# Patient Record
(demographics unavailable — no encounter records)

---

## 2025-01-15 NOTE — PLAN
[FreeTextEntry1] : #HCM - MOLST/HCP forms provided, discussion regarding advanced directives >15 min

## 2025-01-15 NOTE — HISTORY OF PRESENT ILLNESS
[Spouse] : spouse [FreeTextEntry8] : 74-year-old female with type 2 diabetes (most recent A1c was 7.3%), hypertension, hyperlipidemia, hypothyroidism, osteopenia, history of B-cell lymphoma in 2006 currently in remission is presenting today for an acute visit as well as establishment of care visit. She follows with endocrinology who recently checked her A1c and was 7.3%.  She is compliant with Janumet and she has been diabetic for the past 8 years.  She has a cardiologist and workup has been reviewed which includes a normal stress echo.  She would like to get her thyroid hormone checked today as her TSH was elevated several months ago and her dose of levothyroxine was adjusted most recently to 88 mcg daily. Furthermore, she had a DEXA scan as well as a mammogram in October 2024 which were both reportedly normal.  She is not interested in a colonoscopy today and would like to do a Cologuard.  She is also not interested in doing vaccinations today.

## 2025-01-15 NOTE — HEALTH RISK ASSESSMENT
[No] : In the past 12 months have you used drugs other than those required for medical reasons? No [No falls in past year] : Patient reported no falls in the past year [Little interest or pleasure doing things] : 1) Little interest or pleasure doing things [Feeling down, depressed, or hopeless] : 2) Feeling down, depressed, or hopeless [0] : 2) Feeling down, depressed, or hopeless: Not at all (0) [PHQ-2 Negative - No further assessment needed] : PHQ-2 Negative - No further assessment needed [Never] : Never [de-identified] : no [de-identified] : endocrinologist  [de-identified] : fair  [de-identified] : fair  [[id number]] : 0

## 2025-06-13 NOTE — HISTORY OF PRESENT ILLNESS
[de-identified] : 75-year-old female with type 2 diabetes (most recent A1c was 7.3%), hypertension, hyperlipidemia, hypothyroidism, osteopenia, history of B-cell lymphoma in 2006 currently in remission is presenting today for an annual wellness visit. She states that her blood pressure is normal at home and in fact was on the low side and thinks it is high today because she is upset that she had to wait a long time in the waiting area.  She is compliant with all of her medications.  Recent DEXA scan from December 2024 was reviewed and showed osteopenia, she has been on alendronate 70 mg weekly for the past 5 years and wants to know if she can come off of it.  In addition, she had a mammogram in December 2024 which was BI-RADS 3 and it was recommended that she get a 6-month repeat mammogram and ultrasound.  She does admit to eating more carbohydrates than usual but is compliant with all of her medications as prescribed.  Her endocrinologist recently decreased the dosage of levothyroxine to 50 mcg daily.

## 2025-06-13 NOTE — HISTORY OF PRESENT ILLNESS
[de-identified] : 75-year-old female with type 2 diabetes (most recent A1c was 7.3%), hypertension, hyperlipidemia, hypothyroidism, osteopenia, history of B-cell lymphoma in 2006 currently in remission is presenting today for an annual wellness visit. She states that her blood pressure is normal at home and in fact was on the low side and thinks it is high today because she is upset that she had to wait a long time in the waiting area.  She is compliant with all of her medications.  Recent DEXA scan from December 2024 was reviewed and showed osteopenia, she has been on alendronate 70 mg weekly for the past 5 years and wants to know if she can come off of it.  In addition, she had a mammogram in December 2024 which was BI-RADS 3 and it was recommended that she get a 6-month repeat mammogram and ultrasound.  She does admit to eating more carbohydrates than usual but is compliant with all of her medications as prescribed.  Her endocrinologist recently decreased the dosage of levothyroxine to 50 mcg daily.

## 2025-06-13 NOTE — HEALTH RISK ASSESSMENT
[Good] : ~his/her~  mood as  good [No] : In the past 12 months have you used drugs other than those required for medical reasons? No [No falls in past year] : Patient reported no falls in the past year [Little interest or pleasure doing things] : 1) Little interest or pleasure doing things [Feeling down, depressed, or hopeless] : 2) Feeling down, depressed, or hopeless [0] : 2) Feeling down, depressed, or hopeless: Not at all (0) [PHQ-2 Negative - No further assessment needed] : PHQ-2 Negative - No further assessment needed [Patient reported mammogram was abnormal] : Patient reported mammogram was abnormal [Patient reported bone density results were normal] : Patient reported bone density results were normal [HIV test declined] : HIV test declined [Hepatitis C test declined] : Hepatitis C test declined [With Significant Other] : lives with significant other [Unemployed] : unemployed [] :  [Retired] : retired [Fully functional (bathing, dressing, toileting, transferring, walking, feeding)] : Fully functional (bathing, dressing, toileting, transferring, walking, feeding) [Feels Safe at Home] : Feels safe at home [Fully functional (using the telephone, shopping, preparing meals, housekeeping, doing laundry, using] : Fully functional and needs no help or supervision to perform IADLs (using the telephone, shopping, preparing meals, housekeeping, doing laundry, using transportation, managing medications and managing finances) [Reports normal functional visual acuity (ie: able to read med bottle)] : Reports normal functional visual acuity [Smoke Detector] : smoke detector [Safety elements used in home] : safety elements used in home [Seat Belt] :  uses seat belt [Sunscreen] : does not use sunscreen [Travel to Developing Areas] : does not  travel to developing areas [Caregiver Concerns] : does not have caregiver concerns [MammogramComments] : found lump but it was benign [FreeTextEntry1] : physical  [de-identified] : no [de-identified] : cardiologist  [de-identified] : better [de-identified] : walking  [[id number]] : 0 [None] : Patient does not have any barriers to medication adherence [Yes] : Reviewed medication list for presence of high-risk medications. [Never] : Never [NO] : No [Change in mental status noted] : No change in mental status noted [Sexually Active] : not sexually active [High Risk Behavior] : no high risk behavior [Reports changes in hearing] : Reports no changes in hearing [Reports changes in vision] : Reports no changes in vision [Reports changes in dental health] : Reports no changes in dental health [TB Exposure] : is not being exposed to tuberculosis [MammogramDate] : 12/2024 [BoneDensityDate] : 5/2024

## 2025-06-13 NOTE — PLAN
[FreeTextEntry1] :  #HCM - CBC with differential, CMP, hemoglobin A1c, vitamin B12, vitamin D, urine analysis, lipid panel, TFTs all ordered. Patient to be informed of results. - The patient has screened negative for depression and excessive alcohol use. - The patient has never used tobacco products. - The [place] Preventive Service Task Force recommends yearly lung cancer screening with LDCT for people who have a 20 pack-year or more smoking history and smoke now or have quit within the past 15 years and are between 50 and 80 years old. - Colon cancer screening was discussed at today's visit, negative fecal occult blood 2025 - Breast cancer screening has been discussed at today's visit. Refuses repeat mammogram and US and wants to repeat in Dec 2025, she understands the risks. - Counseled on maintaining a healthy body weight. It has been estimated that up to one-third of cardiovascular disease deaths may be preventable by healthy diet and physical activity. - Osteoporosis screening (DEXA) recommended for men and women >65 years or men and women <65 years with risk factors for fractures. - Pneumococcal vaccine recommended in patients >65 years or those <65 years with high risk conditions.  - 2 dose Shingles vaccine recommended in patient >50 years. Refused. - Abdominal aortic aneurysm screening is recommended one time in men ages 65-75 years who are current or former smokers. A one-time ultrasound screening for AAA in men ages 65 to 75 who have never smoked but who have a first-degree relative who required repair of an AAA or  from a ruptured AAA. - Annual eye exam and q6 month dental exams recommended. - Advance directives and health Care Proxy previously discussed

## 2025-06-13 NOTE — HEALTH RISK ASSESSMENT
[Good] : ~his/her~  mood as  good [No] : In the past 12 months have you used drugs other than those required for medical reasons? No [No falls in past year] : Patient reported no falls in the past year [Little interest or pleasure doing things] : 1) Little interest or pleasure doing things [Feeling down, depressed, or hopeless] : 2) Feeling down, depressed, or hopeless [0] : 2) Feeling down, depressed, or hopeless: Not at all (0) [PHQ-2 Negative - No further assessment needed] : PHQ-2 Negative - No further assessment needed [Patient reported mammogram was abnormal] : Patient reported mammogram was abnormal [Patient reported bone density results were normal] : Patient reported bone density results were normal [HIV test declined] : HIV test declined [Hepatitis C test declined] : Hepatitis C test declined [With Significant Other] : lives with significant other [Unemployed] : unemployed [Retired] : retired [] :  [Feels Safe at Home] : Feels safe at home [Fully functional (bathing, dressing, toileting, transferring, walking, feeding)] : Fully functional (bathing, dressing, toileting, transferring, walking, feeding) [Fully functional (using the telephone, shopping, preparing meals, housekeeping, doing laundry, using] : Fully functional and needs no help or supervision to perform IADLs (using the telephone, shopping, preparing meals, housekeeping, doing laundry, using transportation, managing medications and managing finances) [Reports normal functional visual acuity (ie: able to read med bottle)] : Reports normal functional visual acuity [Smoke Detector] : smoke detector [Safety elements used in home] : safety elements used in home [Seat Belt] :  uses seat belt [Sunscreen] : does not use sunscreen [Travel to Developing Areas] : does not  travel to developing areas [Caregiver Concerns] : does not have caregiver concerns [MammogramComments] : found lump but it was benign [FreeTextEntry1] : physical  [de-identified] : no [de-identified] : cardiologist  [de-identified] : better [de-identified] : walking  [[id number]] : 0 [None] : Patient does not have any barriers to medication adherence [Yes] : Reviewed medication list for presence of high-risk medications. [Never] : Never [NO] : No [Change in mental status noted] : No change in mental status noted [Sexually Active] : not sexually active [High Risk Behavior] : no high risk behavior [Reports changes in hearing] : Reports no changes in hearing [Reports changes in vision] : Reports no changes in vision [Reports changes in dental health] : Reports no changes in dental health [TB Exposure] : is not being exposed to tuberculosis [MammogramDate] : 12/2024 [BoneDensityDate] : 5/2024